# Patient Record
Sex: FEMALE | URBAN - METROPOLITAN AREA
[De-identification: names, ages, dates, MRNs, and addresses within clinical notes are randomized per-mention and may not be internally consistent; named-entity substitution may affect disease eponyms.]

---

## 2024-02-21 ENCOUNTER — OV NP (OUTPATIENT)
Dept: URBAN - METROPOLITAN AREA SURGERY CENTER 8 | Facility: SURGERY CENTER | Age: 49
End: 2024-02-21

## 2024-03-18 ENCOUNTER — OV NP (OUTPATIENT)
Dept: URBAN - METROPOLITAN AREA CLINIC 33 | Facility: CLINIC | Age: 49
End: 2024-03-18

## 2024-03-18 NOTE — HPI-CIRRHOSIS
48 year old female patient presents today for a consultation of cirrhosis. She admits/denies a longstanding history of cirrhosis. Patient was diagnosed via --, by  --, on 01.08.2024.  He/She admits any fatigue, easy bruising, decreased appetite, nausea, vomiting, edema, unintentional weight loss, or jaundice.  Admits/Denies any shortness of breath.   Most recent labs completed on 01.08.2024 revealed Chloride: 108H, Glucose: 209H, Osmo (Calc): 279L, Hgb A1c: 7.4H, all other labs were normal.  CT Angio Pulmonary (01.07.2024): No central or segmental pulmonary emboli. Morphologic changes of cirrhosis involving  the liver as before. Subtle reticular nodular/groundglass opacities in the right upper lobe may be chronic although component of underlying bronchitis or inflammatory disease is difficult to exclude.   NM Hepatobiliary Imaging w/ Drug (12.08.2021): Unremarkable HIDA scan with normal gallbladder ejection fraction. Patency of the cystic duct effectively excludes acute cholecystitis. Gallbladder wall thickening seen on recent ultrasound evalutaion includes differntial diagnosis of inflammatory processes such as hepatitis, pancreatitis as well as generalized third space of fluid/edema, cirrhosis and ascites, and less likelt with this appearance adenomyomatosis.   US Abdomen RUQ (12.08.2021): Cholelithiasis. Striated gallbladder wall thickening. Pericholecystic fluid. Constellation of findings is usually associated with acute cholecystitis. Cystic duct patency is most sensitively evaluated by hepatobiliary scintigraphy, if indicated. Mild bile duct dilatation. Obstructing stone or mass is not identified, but cannot excluded. Normal sonographic appearance of the liver.   CT Abdomen/Pelvis w/Contrast (12.08.2021): Gallbladder distention with pericholecystic inflammatory stranding and fluid along with tiny gallstones. Findings are suspicious for acute cholecystitis. Nodular contour of the liver surface compatible with cirrhosis. Borderline splenomegaly and small collateral vessels in the left upper quadrant compatiable with an element of portal venos hypertension. Small supraumbilical hernia in the midline ventral abdominal wall containing edematous fat. The edema within the fat could reflect an element of congestion.   US Abdomen RUQ (11.29.2021): Cholelithiasis. There is associated sludge and gallbladder wall thickening raising the possibility of cholecystitis. Suggest clinical sorrelation. If clinically indicated, nuclear medicine HIDA scan is suggested for further evaluation.   NM Hepatobiliary Imaging w/Drug (11.29.2021): No evidence of biliary system obstruction. Gallbladder ejection fraction calculated at 76%.   RISK FACTORS: Admits/Denies  Alcohol, drugs, travel outside the [place], NSAIDs, protein supplements, tattoos, piercings,  service, blood transfusion, family history of liver disease or cancer, personal exposure to liver diseases, half-way, rehab.     MELD Score:  Complications of liver disease include: Yes/No Jaundice: Yes/No Hepatic Encephalopathy (lose of Brain function due to liver not working) : Yes/No  Ascites (abdominal fluid): Yes/No  Spontaneous Bacterial Peritonitis (ascites fluid infection): Yes/No  Variceal hemorrhage (bleeding within the veins due to high B/P): Yes/No  Portal Vein Thrombosis (blockage or narrowing of the portal vein): Yes/No  Muscle Mass Loss: Yes/No  Weight Loss: Yes/No  Sleeping habits: -- hours per night.

## 2024-05-02 ENCOUNTER — LAB OUTSIDE AN ENCOUNTER (OUTPATIENT)
Dept: URBAN - METROPOLITAN AREA CLINIC 37 | Facility: CLINIC | Age: 49
End: 2024-05-02

## 2024-05-02 ENCOUNTER — DASHBOARD ENCOUNTERS (OUTPATIENT)
Age: 49
End: 2024-05-02

## 2024-05-02 ENCOUNTER — OFFICE VISIT (OUTPATIENT)
Dept: URBAN - METROPOLITAN AREA CLINIC 37 | Facility: CLINIC | Age: 49
End: 2024-05-02
Payer: COMMERCIAL

## 2024-05-02 VITALS
HEIGHT: 64 IN | DIASTOLIC BLOOD PRESSURE: 74 MMHG | SYSTOLIC BLOOD PRESSURE: 104 MMHG | WEIGHT: 211 LBS | HEART RATE: 85 BPM | BODY MASS INDEX: 36.02 KG/M2 | OXYGEN SATURATION: 95 %

## 2024-05-02 DIAGNOSIS — R10.11 RIGHT UPPER QUADRANT ABDOMINAL PAIN: ICD-10-CM

## 2024-05-02 DIAGNOSIS — Z12.11 SCREENING FOR COLON CANCER: ICD-10-CM

## 2024-05-02 DIAGNOSIS — K74.60 CIRRHOSIS: ICD-10-CM

## 2024-05-02 DIAGNOSIS — B18.2 CHRONIC HEPATITIS C WITHOUT HEPATIC COMA: ICD-10-CM

## 2024-05-02 DIAGNOSIS — K21.9 GASTROESOPHAGEAL REFLUX DISEASE, UNSPECIFIED WHETHER ESOPHAGITIS PRESENT: ICD-10-CM

## 2024-05-02 DIAGNOSIS — I85.10 SECONDARY ESOPHAGEAL VARICES WITHOUT BLEEDING: ICD-10-CM

## 2024-05-02 PROBLEM — 14223005: Status: ACTIVE | Noted: 2024-05-02

## 2024-05-02 PROBLEM — 305058001: Status: ACTIVE | Noted: 2024-05-02

## 2024-05-02 PROBLEM — 301717006: Status: ACTIVE | Noted: 2024-05-02

## 2024-05-02 PROBLEM — 235595009: Status: ACTIVE | Noted: 2024-05-02

## 2024-05-02 PROBLEM — 128302006: Status: ACTIVE | Noted: 2024-05-02

## 2024-05-02 PROCEDURE — 99204 OFFICE O/P NEW MOD 45 MIN: CPT | Performed by: INTERNAL MEDICINE

## 2024-05-02 RX ORDER — PROPRANOLOL HYDROCHLORIDE 10 MG/1
1 TABLET TABLET ORAL TWICE A DAY
Status: ACTIVE | COMMUNITY

## 2024-05-02 RX ORDER — PROPRANOLOL HYDROCHLORIDE 10 MG/1
1 TABLET TABLET ORAL TWICE DAILY
Qty: 60 | Refills: 11 | OUTPATIENT
Start: 2024-05-02

## 2024-05-02 RX ORDER — ALBUTEROL SULFATE 90 UG/1
1 PUFF AS NEEDED AEROSOL, METERED RESPIRATORY (INHALATION)
Status: ACTIVE | COMMUNITY

## 2024-05-02 RX ORDER — CLOPIDOGREL BISULFATE 75 MG/1
1 TABLET TABLET ORAL ONCE A DAY
Status: ACTIVE | COMMUNITY

## 2024-05-02 RX ORDER — PANTOPRAZOLE SODIUM 40 MG/1
1 TABLET TABLET, DELAYED RELEASE ORAL
Qty: 90 | Refills: 3 | OUTPATIENT
Start: 2024-05-02

## 2024-05-02 RX ORDER — METFORMIN HYDROCHLORIDE 500 MG/1
1 TABLET WITH A MEAL TABLET, FILM COATED ORAL TWICE A DAY
Status: ACTIVE | COMMUNITY

## 2024-05-02 RX ORDER — ROSUVASTATIN CALCIUM 20 MG/1
1 TABLET TABLET, COATED ORAL ONCE A DAY
Status: ACTIVE | COMMUNITY

## 2024-05-02 RX ORDER — PANTOPRAZOLE SODIUM 40 MG/1
1 TABLET TABLET, DELAYED RELEASE ORAL ONCE A DAY
Status: ACTIVE | COMMUNITY

## 2024-05-02 RX ORDER — SPIRONOLACTONE 50 MG/1
1 TABLET TABLET, FILM COATED ORAL ONCE A DAY
Status: ACTIVE | COMMUNITY

## 2024-05-02 RX ORDER — FLUTICASONE FUROATE, UMECLIDINIUM BROMIDE AND VILANTEROL TRIFENATATE 100; 62.5; 25 UG/1; UG/1; UG/1
1 PUFF POWDER RESPIRATORY (INHALATION) ONCE A DAY
Status: ACTIVE | COMMUNITY

## 2024-05-02 RX ORDER — TRAMADOL HYDROCHLORIDE AND ACETAMINOPHEN 37.5; 325 MG/1; MG/1
1 TABLET TABLET, FILM COATED ORAL
Qty: 45 TABLET | Refills: 1 | OUTPATIENT
Start: 2024-05-02 | End: 2024-06-01

## 2024-05-20 LAB
A/G RATIO: 1.1
AFP, SERUM, TUMOR MARKER: 4.2
ALBUMIN: 3.9
ALKALINE PHOSPHATASE: 116
ALT (SGPT): 34
AST (SGOT): 32
BILIRUBIN, TOTAL: 1.2
BUN/CREATININE RATIO: (no result)
BUN: 18
CALCIUM: 9.8
CARBON DIOXIDE, TOTAL: 22
CHLORIDE: 103
CREATININE: 0.78
EGFR: 94
GLOBULIN, TOTAL: 3.6
GLUCOSE: 137
HCV RNA, QUANTITATIVE REAL TIME PCR: <1.18
HCV RNA, QUANTITATIVE REAL TIME PCR: <15
HEMATOCRIT: 47.1
HEMOGLOBIN: 16.1
HEPATITIS A AB, TOTAL: REACTIVE
HEPATITIS B CORE AB TOTAL: (no result)
HEPATITIS B SURFACE ANTIBODY QL: (no result)
HEPATITIS B SURFACE ANTIGEN: (no result)
HIV AG/AB, 4TH GEN: (no result)
INR: 1.2
MCH: 33.2
MCHC: 34.2
MCV: 97.1
MPV: 10.5
PLATELET COUNT: 114
POTASSIUM: 4.4
PROTEIN, TOTAL: 7.5
PT: 12.5
RDW: 13.3
RED BLOOD CELL COUNT: 4.85
SODIUM: 137
WHITE BLOOD CELL COUNT: 6.4

## 2024-06-06 ENCOUNTER — OFFICE VISIT (OUTPATIENT)
Dept: URBAN - METROPOLITAN AREA CLINIC 37 | Facility: CLINIC | Age: 49
End: 2024-06-06
Payer: COMMERCIAL

## 2024-06-06 VITALS
HEART RATE: 80 BPM | DIASTOLIC BLOOD PRESSURE: 78 MMHG | WEIGHT: 210 LBS | HEIGHT: 64 IN | OXYGEN SATURATION: 98 % | BODY MASS INDEX: 35.85 KG/M2 | SYSTOLIC BLOOD PRESSURE: 110 MMHG

## 2024-06-06 DIAGNOSIS — I85.10 SECONDARY ESOPHAGEAL VARICES WITHOUT BLEEDING: ICD-10-CM

## 2024-06-06 DIAGNOSIS — Z12.11 SCREENING FOR COLON CANCER: ICD-10-CM

## 2024-06-06 DIAGNOSIS — K21.9 GASTROESOPHAGEAL REFLUX DISEASE, UNSPECIFIED WHETHER ESOPHAGITIS PRESENT: ICD-10-CM

## 2024-06-06 DIAGNOSIS — K74.60 CIRRHOSIS: ICD-10-CM

## 2024-06-06 DIAGNOSIS — B18.2 CHRONIC HEPATITIS C WITHOUT HEPATIC COMA: ICD-10-CM

## 2024-06-06 DIAGNOSIS — R10.11 RIGHT UPPER QUADRANT ABDOMINAL PAIN: ICD-10-CM

## 2024-06-06 PROCEDURE — 99214 OFFICE O/P EST MOD 30 MIN: CPT | Performed by: INTERNAL MEDICINE

## 2024-06-06 RX ORDER — PROPRANOLOL HYDROCHLORIDE 10 MG/1
1 TABLET TABLET ORAL TWICE A DAY
Status: ACTIVE | COMMUNITY

## 2024-06-06 RX ORDER — PROPRANOLOL HYDROCHLORIDE 10 MG/1
1 TABLET TABLET ORAL TWICE DAILY
Qty: 60 | Refills: 11 | OUTPATIENT

## 2024-06-06 RX ORDER — FLUTICASONE FUROATE, UMECLIDINIUM BROMIDE AND VILANTEROL TRIFENATATE 100; 62.5; 25 UG/1; UG/1; UG/1
1 PUFF POWDER RESPIRATORY (INHALATION) ONCE A DAY
Status: ACTIVE | COMMUNITY

## 2024-06-06 RX ORDER — ROSUVASTATIN CALCIUM 20 MG/1
1 TABLET TABLET, COATED ORAL ONCE A DAY
Status: ACTIVE | COMMUNITY

## 2024-06-06 RX ORDER — CLOPIDOGREL BISULFATE 75 MG/1
1 TABLET TABLET ORAL ONCE A DAY
Status: ACTIVE | COMMUNITY

## 2024-06-06 RX ORDER — ALBUTEROL SULFATE 90 UG/1
1 PUFF AS NEEDED AEROSOL, METERED RESPIRATORY (INHALATION)
Status: ACTIVE | COMMUNITY

## 2024-06-06 RX ORDER — METFORMIN HYDROCHLORIDE 1000 MG/1
1 TABLET WITH A MEAL TABLET, FILM COATED ORAL TWICE A DAY
Status: ACTIVE | COMMUNITY

## 2024-06-06 RX ORDER — PANTOPRAZOLE SODIUM 40 MG/1
1 TABLET TABLET, DELAYED RELEASE ORAL
Qty: 90 | Refills: 3 | OUTPATIENT

## 2024-06-06 RX ORDER — AMBRISENTAN 5 MG/1
1 TABLET TABLET, FILM COATED ORAL ONCE A DAY
Status: ACTIVE | COMMUNITY

## 2024-06-06 RX ORDER — SPIRONOLACTONE 50 MG/1
1 TABLET TABLET, FILM COATED ORAL ONCE A DAY
Status: ACTIVE | COMMUNITY

## 2024-06-06 RX ORDER — PANTOPRAZOLE SODIUM 40 MG/1
1 TABLET TABLET, DELAYED RELEASE ORAL ONCE A DAY
Status: ACTIVE | COMMUNITY

## 2024-06-06 RX ORDER — TADALAFIL 20 MG/1
1 TABLET AS NEEDED TABLET, FILM COATED ORAL ONCE A DAY
Status: ACTIVE | COMMUNITY

## 2024-06-06 NOTE — HPI-GERD
Patient is here for a routine follow up due to history of GERD Last OV was 5 weeks ago Patient has been on Pantoprazole 40 mg daily since Januaury 2024 for GERD. Patient was previosuly on Famotidine but was still have breakthough pyrosis with medication.  Current symptoms: denies GERD symptoms Patient denies dysphagia or odynophagia

## 2024-06-06 NOTE — HPI-ABDOMINAL PAIN
Patient is here for a routine follow up due to abdominal pain  Last OV was 5 weeks ago In mid April 2024, she developed LUQ, intermittently and sharp pain  Also has RUQ abdominal pain that has been present for 3-4 months now and reports pain is constant and will typically last for 2 days and resolve for a week or more and then have recurrent symptoms.  Patient was prescribed Tramadol-Acetaminophen 37.5-325 mg, 1 tablet daily, every 8hours for pain. However, patient reports she only took pain medication twice and reports she has severe nausea and vomiting, Current symptoms: Patient continue with RUQ abominal pain that varies from dull to severe pain Also continues with intermittent LUQ abdominal pain that is described as thobbing

## 2024-08-21 ENCOUNTER — LAB OUTSIDE AN ENCOUNTER (OUTPATIENT)
Dept: URBAN - METROPOLITAN AREA CLINIC 35 | Facility: CLINIC | Age: 49
End: 2024-08-21

## 2024-08-22 LAB — HIV AG/AB, 4TH GEN: (no result)

## 2024-08-29 ENCOUNTER — LAB OUTSIDE AN ENCOUNTER (OUTPATIENT)
Dept: URBAN - METROPOLITAN AREA CLINIC 37 | Facility: CLINIC | Age: 49
End: 2024-08-29

## 2024-08-29 ENCOUNTER — OFFICE VISIT (OUTPATIENT)
Dept: URBAN - METROPOLITAN AREA CLINIC 37 | Facility: CLINIC | Age: 49
End: 2024-08-29
Payer: COMMERCIAL

## 2024-08-29 VITALS
HEART RATE: 81 BPM | WEIGHT: 207 LBS | SYSTOLIC BLOOD PRESSURE: 108 MMHG | OXYGEN SATURATION: 96 % | BODY MASS INDEX: 35.34 KG/M2 | HEIGHT: 64 IN | DIASTOLIC BLOOD PRESSURE: 78 MMHG

## 2024-08-29 DIAGNOSIS — Z12.11 SCREENING FOR COLON CANCER: ICD-10-CM

## 2024-08-29 DIAGNOSIS — B18.2 CHRONIC HEPATITIS C WITHOUT HEPATIC COMA: ICD-10-CM

## 2024-08-29 DIAGNOSIS — I85.10 SECONDARY ESOPHAGEAL VARICES WITHOUT BLEEDING: ICD-10-CM

## 2024-08-29 DIAGNOSIS — K74.69 CIRRHOSIS, CRYPTOGENIC: ICD-10-CM

## 2024-08-29 PROCEDURE — 99214 OFFICE O/P EST MOD 30 MIN: CPT | Performed by: INTERNAL MEDICINE

## 2024-08-29 RX ORDER — FLUTICASONE FUROATE, UMECLIDINIUM BROMIDE AND VILANTEROL TRIFENATATE 100; 62.5; 25 UG/1; UG/1; UG/1
1 PUFF POWDER RESPIRATORY (INHALATION) ONCE A DAY
Status: ACTIVE | COMMUNITY

## 2024-08-29 RX ORDER — ROSUVASTATIN CALCIUM 20 MG/1
1 TABLET TABLET, COATED ORAL ONCE A DAY
Status: DISCONTINUED | COMMUNITY

## 2024-08-29 RX ORDER — TADALAFIL 20 MG/1
1 TABLET AS NEEDED TABLET, FILM COATED ORAL ONCE A DAY
Status: DISCONTINUED | COMMUNITY

## 2024-08-29 RX ORDER — PROPRANOLOL HYDROCHLORIDE 10 MG/1
1 TABLET TABLET ORAL TWICE A DAY
Status: ACTIVE | COMMUNITY

## 2024-08-29 RX ORDER — PANTOPRAZOLE SODIUM 40 MG/1
1 TABLET TABLET, DELAYED RELEASE ORAL
Qty: 90 | Refills: 3 | OUTPATIENT

## 2024-08-29 RX ORDER — SPIRONOLACTONE 50 MG/1
1 TABLET TABLET, FILM COATED ORAL ONCE A DAY
Status: ACTIVE | COMMUNITY

## 2024-08-29 RX ORDER — ALBUTEROL SULFATE 90 UG/1
1 PUFF AS NEEDED AEROSOL, METERED RESPIRATORY (INHALATION)
Status: ACTIVE | COMMUNITY

## 2024-08-29 RX ORDER — CLOPIDOGREL BISULFATE 75 MG/1
1 TABLET TABLET ORAL ONCE A DAY
Status: ACTIVE | COMMUNITY

## 2024-08-29 RX ORDER — ATORVASTATIN CALCIUM 40 MG/1
1 TABLET TABLET, FILM COATED ORAL ONCE A DAY
Status: ACTIVE | COMMUNITY

## 2024-08-29 RX ORDER — PANTOPRAZOLE SODIUM 40 MG/1
1 TABLET TABLET, DELAYED RELEASE ORAL ONCE A DAY
Status: ACTIVE | COMMUNITY

## 2024-08-29 RX ORDER — PROPRANOLOL HYDROCHLORIDE 10 MG/1
1 TABLET TABLET ORAL TWICE DAILY
Qty: 60 | Refills: 11

## 2024-08-29 RX ORDER — SODIUM, POTASSIUM,MAG SULFATES 17.5-3.13G
177 ML SOLUTION, RECONSTITUTED, ORAL ORAL
Qty: 354 MILLILITER | Refills: 0 | OUTPATIENT
Start: 2024-08-29 | End: 2024-08-31

## 2024-08-29 RX ORDER — AMBRISENTAN 10 MG/1
1 TABLET TABLET, FILM COATED ORAL ONCE A DAY
Status: ACTIVE | COMMUNITY

## 2024-08-29 RX ORDER — METFORMIN HYDROCHLORIDE 1000 MG/1
1 TABLET WITH A MEAL TABLET, FILM COATED ORAL TWICE A DAY
Status: ACTIVE | COMMUNITY

## 2024-08-29 RX ORDER — PROPRANOLOL HYDROCHLORIDE 10 MG/1
1 TABLET TABLET ORAL TWICE DAILY
Qty: 60 | Refills: 11 | Status: ACTIVE | COMMUNITY

## 2024-08-29 NOTE — HPI-ABDOMINAL PAIN
Patient is here for a routine follow up due to abdominal pain  Last OV was 2 months ago In mid April 2024, she developed LUQ, intermittently and sharp pain  Also has RUQ abdominal pain that has been present for 5-6 months now and reports pain is constant and will typically last for 2 days and resolve for a week or more and then have recurrent symptoms.  Patient was prescribed Tramadol-Acetaminophen 37.5-325 mg, 1 tablet daily, every 8 hours for pain. However, patient reports she only took pain medication twice and reports she has severe nausea and vomiting, Current symptoms: Patient continue with RUQ abominal pain that varies from dull to severe pain, theres are certain food that trigger.  Also continues with intermittent LUQ abdominal pain that is described as thobbing

## 2024-08-29 NOTE — HPI-GERD
Patient is here for a routine follow up due to history of GERD Last OV was 2 months ago Patient has been on Pantoprazole 40 mg daily since Januaury 2024 for GERD. Patient was previosuly on Famotidine but was still have breakthough pyrosis with medication.  Current symptoms: denies GERD symptoms Patient denies dysphagia or odynophagia

## 2024-10-18 ENCOUNTER — TELEPHONE ENCOUNTER (OUTPATIENT)
Dept: URBAN - METROPOLITAN AREA CLINIC 36 | Facility: CLINIC | Age: 49
End: 2024-10-18

## 2024-10-21 ENCOUNTER — OFFICE VISIT (OUTPATIENT)
Dept: URBAN - METROPOLITAN AREA MEDICAL CENTER 10 | Facility: MEDICAL CENTER | Age: 49
End: 2024-10-21

## 2024-11-07 ENCOUNTER — OFFICE VISIT (OUTPATIENT)
Dept: URBAN - METROPOLITAN AREA CLINIC 37 | Facility: CLINIC | Age: 49
End: 2024-11-07

## 2024-11-07 RX ORDER — PANTOPRAZOLE SODIUM 40 MG/1
1 TABLET TABLET, DELAYED RELEASE ORAL ONCE A DAY
Status: ACTIVE | COMMUNITY

## 2024-11-07 RX ORDER — PROPRANOLOL HYDROCHLORIDE 10 MG/1
1 TABLET TABLET ORAL TWICE A DAY
Status: ACTIVE | COMMUNITY

## 2024-11-07 RX ORDER — PANTOPRAZOLE SODIUM 40 MG/1
1 TABLET TABLET, DELAYED RELEASE ORAL
Qty: 90 | Refills: 3 | OUTPATIENT

## 2024-11-07 RX ORDER — SPIRONOLACTONE 50 MG/1
1 TABLET TABLET, FILM COATED ORAL ONCE A DAY
Status: ACTIVE | COMMUNITY

## 2024-11-07 RX ORDER — PANTOPRAZOLE SODIUM 40 MG/1
1 TABLET TABLET, DELAYED RELEASE ORAL
Qty: 90 | Refills: 3 | Status: ACTIVE | COMMUNITY

## 2024-11-07 RX ORDER — ATORVASTATIN CALCIUM 40 MG/1
1 TABLET TABLET, FILM COATED ORAL ONCE A DAY
Status: ACTIVE | COMMUNITY

## 2024-11-07 RX ORDER — PROPRANOLOL HYDROCHLORIDE 10 MG/1
1 TABLET TABLET ORAL TWICE DAILY
Qty: 60 | Refills: 11 | Status: ACTIVE | COMMUNITY

## 2024-11-07 RX ORDER — FLUTICASONE FUROATE, UMECLIDINIUM BROMIDE AND VILANTEROL TRIFENATATE 100; 62.5; 25 UG/1; UG/1; UG/1
1 PUFF POWDER RESPIRATORY (INHALATION) ONCE A DAY
Status: ACTIVE | COMMUNITY

## 2024-11-07 RX ORDER — PROPRANOLOL HYDROCHLORIDE 10 MG/1
1 TABLET TABLET ORAL TWICE DAILY
Qty: 60 | Refills: 11

## 2024-11-07 RX ORDER — ALBUTEROL SULFATE 90 UG/1
1 PUFF AS NEEDED AEROSOL, METERED RESPIRATORY (INHALATION)
Status: ACTIVE | COMMUNITY

## 2024-11-07 RX ORDER — METFORMIN HYDROCHLORIDE 1000 MG/1
1 TABLET WITH A MEAL TABLET, FILM COATED ORAL TWICE A DAY
Status: ACTIVE | COMMUNITY

## 2024-11-07 RX ORDER — AMBRISENTAN 10 MG/1
1 TABLET TABLET, FILM COATED ORAL ONCE A DAY
Status: ACTIVE | COMMUNITY

## 2024-11-07 RX ORDER — CLOPIDOGREL BISULFATE 75 MG/1
1 TABLET TABLET ORAL ONCE A DAY
Status: ACTIVE | COMMUNITY

## 2024-11-07 NOTE — HPI-ABDOMINAL PAIN
Patient is here for a routine follow up due to abdominal pain  Last OV was 2 months ago In mid April 2024, she developed LUQ, intermittently and sharp pain  Also has RUQ abdominal pain that has been present for 5-6 months now and reports pain is constant and will typically last for 2 days and resolve for a week or more and then have recurrent symptoms.  Patient was prescribed Tramadol-Acetaminophen 37.5-325 mg, 1 tablet daily, every 8 hours for pain. However, patient reports she only took pain medication twice and reports she has severe nausea and vomiting, Current symptoms: ?? Also continues with intermittent LUQ abdominal pain that is described as thobbing

## 2024-11-07 NOTE — HPI-GERD
Patient is here for a routine follow up due to history of GERD Last OV was 2 months ago Patient has been on Pantoprazole 40 mg daily since Januaury 2024 for GERD. Patient was previosuly on Famotidine but was still have breakthough pyrosis with medication.  Current symptoms: denies GERD symptoms ?? Patient denies dysphagia or odynophagia

## 2024-12-26 ENCOUNTER — OFFICE VISIT (OUTPATIENT)
Dept: URBAN - METROPOLITAN AREA CLINIC 37 | Facility: CLINIC | Age: 49
End: 2024-12-26

## 2025-01-30 ENCOUNTER — TELEPHONE ENCOUNTER (OUTPATIENT)
Dept: URBAN - METROPOLITAN AREA CLINIC 37 | Facility: CLINIC | Age: 50
End: 2025-01-30

## 2025-03-27 ENCOUNTER — OFFICE VISIT (OUTPATIENT)
Dept: URBAN - METROPOLITAN AREA CLINIC 37 | Facility: CLINIC | Age: 50
End: 2025-03-27